# Patient Record
Sex: MALE | Race: WHITE | NOT HISPANIC OR LATINO | Employment: FULL TIME | URBAN - METROPOLITAN AREA
[De-identification: names, ages, dates, MRNs, and addresses within clinical notes are randomized per-mention and may not be internally consistent; named-entity substitution may affect disease eponyms.]

---

## 2019-03-14 ENCOUNTER — OFFICE VISIT (OUTPATIENT)
Dept: INTERNAL MEDICINE | Facility: CLINIC | Age: 24
End: 2019-03-14
Payer: COMMERCIAL

## 2019-03-14 ENCOUNTER — LAB VISIT (OUTPATIENT)
Dept: LAB | Facility: HOSPITAL | Age: 24
End: 2019-03-14
Attending: PHYSICIAN ASSISTANT
Payer: COMMERCIAL

## 2019-03-14 VITALS
HEIGHT: 68 IN | HEART RATE: 80 BPM | DIASTOLIC BLOOD PRESSURE: 70 MMHG | SYSTOLIC BLOOD PRESSURE: 126 MMHG | BODY MASS INDEX: 31.74 KG/M2 | WEIGHT: 209.44 LBS | TEMPERATURE: 98 F

## 2019-03-14 DIAGNOSIS — Z00.00 ROUTINE GENERAL MEDICAL EXAMINATION AT A HEALTH CARE FACILITY: Primary | ICD-10-CM

## 2019-03-14 DIAGNOSIS — M25.511 RIGHT ANTERIOR SHOULDER PAIN: ICD-10-CM

## 2019-03-14 DIAGNOSIS — J30.1 SEASONAL ALLERGIC RHINITIS DUE TO POLLEN: ICD-10-CM

## 2019-03-14 DIAGNOSIS — Z00.00 ROUTINE GENERAL MEDICAL EXAMINATION AT A HEALTH CARE FACILITY: ICD-10-CM

## 2019-03-14 LAB
ALBUMIN SERPL BCP-MCNC: 4.2 G/DL
ALP SERPL-CCNC: 64 U/L
ALT SERPL W/O P-5'-P-CCNC: 23 U/L
ANION GAP SERPL CALC-SCNC: 9 MMOL/L
AST SERPL-CCNC: 21 U/L
BASOPHILS # BLD AUTO: 0.02 K/UL
BASOPHILS NFR BLD: 0.3 %
BILIRUB SERPL-MCNC: 0.4 MG/DL
BUN SERPL-MCNC: 9 MG/DL
CALCIUM SERPL-MCNC: 9.8 MG/DL
CHLORIDE SERPL-SCNC: 108 MMOL/L
CO2 SERPL-SCNC: 24 MMOL/L
CREAT SERPL-MCNC: 0.9 MG/DL
DIFFERENTIAL METHOD: NORMAL
EOSINOPHIL # BLD AUTO: 0.1 K/UL
EOSINOPHIL NFR BLD: 0.8 %
ERYTHROCYTE [DISTWIDTH] IN BLOOD BY AUTOMATED COUNT: 11.8 %
EST. GFR  (AFRICAN AMERICAN): >60 ML/MIN/1.73 M^2
EST. GFR  (NON AFRICAN AMERICAN): >60 ML/MIN/1.73 M^2
GLUCOSE SERPL-MCNC: 87 MG/DL
HCT VFR BLD AUTO: 40.6 %
HGB BLD-MCNC: 14.1 G/DL
IMM GRANULOCYTES # BLD AUTO: 0.01 K/UL
IMM GRANULOCYTES NFR BLD AUTO: 0.2 %
LYMPHOCYTES # BLD AUTO: 2.2 K/UL
LYMPHOCYTES NFR BLD: 33.7 %
MCH RBC QN AUTO: 30.5 PG
MCHC RBC AUTO-ENTMCNC: 34.7 G/DL
MCV RBC AUTO: 88 FL
MONOCYTES # BLD AUTO: 0.6 K/UL
MONOCYTES NFR BLD: 9.2 %
NEUTROPHILS # BLD AUTO: 3.6 K/UL
NEUTROPHILS NFR BLD: 55.8 %
NRBC BLD-RTO: 0 /100 WBC
PLATELET # BLD AUTO: 252 K/UL
PMV BLD AUTO: 9.9 FL
POTASSIUM SERPL-SCNC: 3.8 MMOL/L
PROT SERPL-MCNC: 7.1 G/DL
RBC # BLD AUTO: 4.63 M/UL
SODIUM SERPL-SCNC: 141 MMOL/L
TSH SERPL DL<=0.005 MIU/L-ACNC: 0.8 UIU/ML
WBC # BLD AUTO: 6.43 K/UL

## 2019-03-14 PROCEDURE — 99999 PR PBB SHADOW E&M-EST. PATIENT-LVL III: CPT | Mod: PBBFAC,,, | Performed by: PHYSICIAN ASSISTANT

## 2019-03-14 PROCEDURE — 99395 PR PREVENTIVE VISIT,EST,18-39: ICD-10-PCS | Mod: S$GLB,,, | Performed by: PHYSICIAN ASSISTANT

## 2019-03-14 PROCEDURE — 84443 ASSAY THYROID STIM HORMONE: CPT

## 2019-03-14 PROCEDURE — 99999 PR PBB SHADOW E&M-EST. PATIENT-LVL III: ICD-10-PCS | Mod: PBBFAC,,, | Performed by: PHYSICIAN ASSISTANT

## 2019-03-14 PROCEDURE — 80053 COMPREHEN METABOLIC PANEL: CPT

## 2019-03-14 PROCEDURE — 85025 COMPLETE CBC W/AUTO DIFF WBC: CPT

## 2019-03-14 PROCEDURE — 99395 PREV VISIT EST AGE 18-39: CPT | Mod: S$GLB,,, | Performed by: PHYSICIAN ASSISTANT

## 2019-03-14 PROCEDURE — 36415 COLL VENOUS BLD VENIPUNCTURE: CPT | Mod: PO

## 2019-03-14 RX ORDER — FLUTICASONE PROPIONATE 50 MCG
1 SPRAY, SUSPENSION (ML) NASAL DAILY
Qty: 1 BOTTLE | Refills: 0 | Status: SHIPPED | OUTPATIENT
Start: 2019-03-14

## 2019-03-14 RX ORDER — MELOXICAM 7.5 MG/1
7.5 TABLET ORAL DAILY
Qty: 30 TABLET | Refills: 0 | Status: SHIPPED | OUTPATIENT
Start: 2019-03-14 | End: 2019-03-25

## 2019-03-14 NOTE — PROGRESS NOTES
Subjective:       Patient ID: William Strong is a 23 y.o. male.    Chief Complaint: Tinnitus and Shoulder Pain    Patient comes in today for annual   History reviewed. No pertinent past medical history.          Ringing in Ears:   Chronicity:  New  Onset:  In the past 7 days  Progression since onset:  Waxing and waning  Frequency:  Every few minutes  Pain scale:  3/10  Severity:  Mild  Duration:  Very brief  Ringing in ear characteristics:  Imparied select discretion   Associated symptoms: tinnitus, buzzing and aural fullness.  No dizziness, no vertigo, no ear congestion, no ear pain, no fever, no headaches, no rhinorrhea, no fluctuance, no otalgia, no otorrhea, no facial weakness and no visual disturbances.No dizziness.  Shoulder Pain    This is a new problem. The current episode started more than 1 month ago. There has been no history of extremity trauma. The problem occurs intermittently. The problem has been unchanged. Pertinent negatives include no fever or headaches.       Health Maintenance Due   Topic Date Due    Influenza Vaccine  08/01/2018       History reviewed. No pertinent past medical history.    Current Outpatient Medications   Medication Sig Dispense Refill    fluticasone (FLONASE) 50 mcg/actuation nasal spray 1 spray (50 mcg total) by Each Nare route once daily. 1 Bottle 0    meloxicam (MOBIC) 7.5 MG tablet Take 1 tablet (7.5 mg total) by mouth once daily. 30 tablet 0     No current facility-administered medications for this visit.        Review of Systems   Constitutional: Negative for fatigue, fever and unexpected weight change.   HENT: Positive for tinnitus. Negative for ear pain, rhinorrhea, sore throat and trouble swallowing.    Respiratory: Negative for cough and shortness of breath.    Cardiovascular: Negative for chest pain.   Gastrointestinal: Negative for abdominal pain.   Musculoskeletal: Positive for arthralgias.   Neurological: Negative for dizziness, vertigo and  "headaches.   Hematological: Negative for adenopathy. Does not bruise/bleed easily.       Objective:   /70   Pulse 80   Temp 98.3 °F (36.8 °C) (Tympanic)   Ht 5' 8" (1.727 m)   Wt 95 kg (209 lb 7 oz)   BMI 31.84 kg/m²      Physical Exam   Constitutional: He is oriented to person, place, and time. He appears well-developed and well-nourished. No distress.   HENT:   Head: Normocephalic and atraumatic.   Right Ear: External ear normal.   Left Ear: External ear normal.   Nose: Nose normal.   Mouth/Throat: Oropharynx is clear and moist.   Eyes: Conjunctivae and EOM are normal. Pupils are equal, round, and reactive to light.   Neck: Normal range of motion. Neck supple.   Cardiovascular: Normal rate, regular rhythm, normal heart sounds and intact distal pulses.   Pulmonary/Chest: Effort normal and breath sounds normal.   Abdominal: Soft. Bowel sounds are normal.   Musculoskeletal: He exhibits no edema.        Right shoulder: He exhibits decreased range of motion and tenderness.   Neurological: He is alert and oriented to person, place, and time.   Skin: Capillary refill takes less than 2 seconds.   Psychiatric: He has a normal mood and affect. His behavior is normal.         Lab Results   Component Value Date    WBC 6.43 03/14/2019    HGB 14.1 03/14/2019    HCT 40.6 03/14/2019     03/14/2019    CHOL 219 (H) 02/09/2016    TRIG 243 (H) 02/09/2016    HDL 33 (L) 02/09/2016    ALT 23 03/14/2019    AST 21 03/14/2019     03/14/2019    K 3.8 03/14/2019     03/14/2019    CREATININE 0.9 03/14/2019    BUN 9 03/14/2019    CO2 24 03/14/2019    TSH 0.797 03/14/2019       Assessment:       1. Routine general medical examination at a health care facility    2. Right anterior shoulder pain    3. Seasonal allergic rhinitis due to pollen        Plan:   Routine general medical examination at a health care facility  -     TSH; Future; Expected date: 03/14/2019  -     Comprehensive metabolic panel; Future; Expected " date: 03/14/2019  -     CBC auto differential; Future; Expected date: 03/14/2019    Right anterior shoulder pain  -     Ambulatory referral to Orthopedics       meloxicam (MOBIC) 7.5 MG tablet; Take 1 tablet (7.5 mg total) by mouth once daily.  Dispense: 30 tablet; Refill: 0  Seasonal allergic rhinitis due to pollen      -     fluticasone (FLONASE) 50 mcg/actuation nasal spray; 1 spray (50 mcg total) by Each Nare route once daily.  Dispense: 1 Bottle; Refill: 0

## 2019-03-19 DIAGNOSIS — M25.511 RIGHT SHOULDER PAIN, UNSPECIFIED CHRONICITY: Primary | ICD-10-CM

## 2019-03-25 ENCOUNTER — HOSPITAL ENCOUNTER (OUTPATIENT)
Dept: RADIOLOGY | Facility: HOSPITAL | Age: 24
Discharge: HOME | End: 2019-03-25
Attending: PHYSICIAN ASSISTANT
Payer: COMMERCIAL

## 2019-03-25 ENCOUNTER — OFFICE VISIT (OUTPATIENT)
Dept: ORTHOPEDICS | Facility: CLINIC | Age: 24
End: 2019-03-25
Payer: COMMERCIAL

## 2019-03-25 VITALS
WEIGHT: 209.44 LBS | SYSTOLIC BLOOD PRESSURE: 141 MMHG | BODY MASS INDEX: 31.74 KG/M2 | DIASTOLIC BLOOD PRESSURE: 78 MMHG | HEART RATE: 84 BPM | HEIGHT: 68 IN

## 2019-03-25 DIAGNOSIS — M75.81 ROTATOR CUFF TENDONITIS, RIGHT: Primary | ICD-10-CM

## 2019-03-25 DIAGNOSIS — M25.511 ACUTE PAIN OF RIGHT SHOULDER: ICD-10-CM

## 2019-03-25 DIAGNOSIS — M79.18 MYOFASCIAL PAIN: ICD-10-CM

## 2019-03-25 DIAGNOSIS — M25.511 RIGHT SHOULDER PAIN, UNSPECIFIED CHRONICITY: ICD-10-CM

## 2019-03-25 PROCEDURE — 20610 DRAIN/INJ JOINT/BURSA W/O US: CPT | Mod: RT,S$GLB,, | Performed by: PHYSICIAN ASSISTANT

## 2019-03-25 PROCEDURE — 73030 XR SHOULDER COMPLETE 2 OR MORE VIEWS RIGHT: ICD-10-PCS | Mod: 26,RT,, | Performed by: RADIOLOGY

## 2019-03-25 PROCEDURE — 20610 PR DRAIN/INJECT LARGE JOINT/BURSA: ICD-10-PCS | Mod: RT,S$GLB,, | Performed by: PHYSICIAN ASSISTANT

## 2019-03-25 PROCEDURE — 99999 PR PBB SHADOW E&M-EST. PATIENT-LVL III: CPT | Mod: PBBFAC,,, | Performed by: PHYSICIAN ASSISTANT

## 2019-03-25 PROCEDURE — 99204 OFFICE O/P NEW MOD 45 MIN: CPT | Mod: 25,S$GLB,, | Performed by: PHYSICIAN ASSISTANT

## 2019-03-25 PROCEDURE — 73030 X-RAY EXAM OF SHOULDER: CPT | Mod: 26,RT,, | Performed by: RADIOLOGY

## 2019-03-25 PROCEDURE — 99999 PR PBB SHADOW E&M-EST. PATIENT-LVL III: ICD-10-PCS | Mod: PBBFAC,,, | Performed by: PHYSICIAN ASSISTANT

## 2019-03-25 PROCEDURE — 73030 X-RAY EXAM OF SHOULDER: CPT | Mod: TC,RT

## 2019-03-25 PROCEDURE — 99204 PR OFFICE/OUTPT VISIT, NEW, LEVL IV, 45-59 MIN: ICD-10-PCS | Mod: 25,S$GLB,, | Performed by: PHYSICIAN ASSISTANT

## 2019-03-25 RX ORDER — TRIAMCINOLONE ACETONIDE 40 MG/ML
40 INJECTION, SUSPENSION INTRA-ARTICULAR; INTRAMUSCULAR
Status: COMPLETED | OUTPATIENT
Start: 2019-03-25 | End: 2019-03-25

## 2019-03-25 RX ORDER — MELOXICAM 15 MG/1
15 TABLET ORAL DAILY
Qty: 30 TABLET | Refills: 2 | Status: SHIPPED | OUTPATIENT
Start: 2019-03-25 | End: 2019-04-24

## 2019-03-25 RX ADMIN — TRIAMCINOLONE ACETONIDE 40 MG: 40 INJECTION, SUSPENSION INTRA-ARTICULAR; INTRAMUSCULAR at 03:03

## 2019-03-25 NOTE — LETTER
March 29, 2019      CHRIS Garvin  [address] [facility] [address] [facility]  [place] [address]           [facility]  [address]  [place] [address]  Phone: [phone]  Fax: [phone]          Patient: William Strong   MR Number: [id number]   YOB: 1995   Date of Visit: 3/25/2019       Dear Juana Zhu:    Thank you for referring William Strong to me for evaluation. Attached you will find relevant portions of my assessment and plan of care.    If you have questions, please do not hesitate to call me. I look forward to following William Strong along with you.    Sincerely,    Leila Alicea  CC:  No Recipients    If you would like to receive this communication electronically, please contact [email or web address] or [phone] to request more information on [employer] Link access.    For providers and/or their staff who would like to refer a patient to [facility], please contact us through our one-stop-shop provider referral line, [facility] , at [phone].    If you feel you have received this communication in error or would no longer like to receive these types of communications, please e-mail [email or web address]

## 2019-03-25 NOTE — PROGRESS NOTES
Subjective:     Patient ID: William Strong is a 23 y.o. male.    Chief Complaint: Pain of the Right Shoulder    William Strong is a 23 y.o. male who presents for right shoulder pain . Episode began 3 1/2 months ago. No known mechanism of injury. Patient is active in the gym and took 2 months off after pain started. Pain improved but continues to linger. Pain is located in posterior shoulder and trapezius. Pain is rated as 7/10 and is described as aching in quality. Pain is exacerbated by laying on right side and with movement of shoulder. Patient has tried Meloxicam 7.5 mg with some relief of symptoms. Patient denies swelling, tingling, numbness, and past surgeries in the shoulder.        History reviewed. No pertinent past medical history.  Past Surgical History:   Procedure Laterality Date    KNEE SURGERY Right     WISDOM TOOTH EXTRACTION       Family History   Problem Relation Age of Onset    Cervical cancer Mother     Hypertension Neg Hx     Diabetes Neg Hx     Heart disease Neg Hx     Stroke Neg Hx     Colon cancer Neg Hx     Prostate cancer Neg Hx      Social History     Socioeconomic History    Marital status: Single     Spouse name: Not on file    Number of children: 0    Years of education: Not on file    Highest education level: Not on file   Occupational History    Occupation: [employer]     Comment: [employer]   Social Needs    Financial resource strain: Not on file    Food insecurity:     Worry: Not on file     Inability: Not on file    Transportation needs:     Medical: Not on file     Non-medical: Not on file   Tobacco Use    Smoking status: Former Smoker     Packs/day: 0.50     Years: 2.00     Pack years: 1.00     Types: Cigarettes    Smokeless tobacco: Never Used   Substance and Sexual Activity    Alcohol use: No     Alcohol/week: 0.0 oz     Comment: Prior alcoholic    Drug use: No     Comment: Prior drug addiction//opiates    Sexual activity: Yes     Partners:  Female   Lifestyle    Physical activity:     Days per week: Not on file     Minutes per session: Not on file    Stress: Not on file   Relationships    Social connections:     Talks on phone: Not on file     Gets together: Not on file     Attends Synagogue service: Not on file     Active member of club or organization: Not on file     Attends meetings of clubs or organizations: Not on file     Relationship status: Not on file    Intimate partner violence:     Fear of current or ex partner: Not on file     Emotionally abused: Not on file     Physically abused: Not on file     Forced sexual activity: Not on file   Other Topics Concern    Not on file   Social History Narrative    Not on file     Medication List with Changes/Refills   Current Medications    FLUTICASONE (FLONASE) 50 MCG/ACTUATION NASAL SPRAY    1 spray (50 mcg total) by Each Nare route once daily.    MELOXICAM (MOBIC) 7.5 MG TABLET    Take 1 tablet (7.5 mg total) by mouth once daily.     Review of patient's allergies indicates:  No Known Allergies  Review of Systems   Constitution: Negative for fever and night sweats.   HENT: Negative for hearing loss.    Eyes: Negative for blurred vision and visual disturbance.   Cardiovascular: Negative for chest pain and leg swelling.   Respiratory: Negative for shortness of breath.    Endocrine: Negative for polyuria.   Hematologic/Lymphatic: Negative for bleeding problem.   Skin: Negative for rash.   Musculoskeletal: Positive for joint pain. Negative for back pain, joint swelling, muscle cramps and muscle weakness.   Gastrointestinal: Negative for melena.   Genitourinary: Negative for hematuria.   Neurological: Negative for loss of balance, numbness and paresthesias.   Psychiatric/Behavioral: Negative for altered mental status.       Objective:   Body mass index is 31.84 kg/m².  Vitals:    03/25/19 1448   BP: (!) 141/78   Pulse: 84       General: William Norton is well-developed, well-nourished, appears stated age,  in no acute distress, alert and oriented.       General    Constitutional: He is oriented to person, place, and time. He appears well-developed and well-nourished.   HENT:   Head: Normocephalic and atraumatic.   Right Ear: External ear normal.   Left Ear: External ear normal.   Nose: Nose normal.   Eyes: EOM are normal. Pupils are equal, round, and reactive to light. Right eye exhibits no discharge. Left eye exhibits no discharge.   Neck: Normal range of motion.   Cardiovascular: Normal heart sounds.    Pulmonary/Chest: Effort normal. No respiratory distress.   Abdominal: Soft.   Neurological: He is alert and oriented to person, place, and time.   Psychiatric: He has a normal mood and affect. His behavior is normal. Judgment and thought content normal.         Back (L-Spine & T-Spine) / Neck (C-Spine) Exam     Tenderness   The patient is tender to palpation of the right trapezial.   Right Shoulder Exam     Inspection/Observation   Swelling: absent  Bruising: absent  Scars: absent  Deformity: absent  Scapular Winging: absent  Scapular Dyskinesia: negative  Atrophy: absent    Tenderness   The patient is tender to palpation of the supraspinatus.    Range of Motion   Active abduction:  90 normal   Passive abduction:  100 normal   Extension:  0 normal   Forward Flexion:  180 normal   Forward Elevation: 180 normal  Adduction: 40 normal  External Rotation 0 degrees:  50 normal   External Rotation 90 degrees: 90 normal  Internal rotation 0 degrees:  T10 normal   Internal rotation 90 degrees:  30 normal     Tests & Signs   Cross arm: negative  Drop arm: negative  Solomon test: positive  Impingement: negative  Lift Off Sign: negative  Active Compression Test (Burns's Sign): negative  Yergason's Test: negative  Speed's Test: negative    Other   Sensation: normal    Left Shoulder Exam     Inspection/Observation   Swelling: absent  Bruising: absent  Scars: absent  Deformity: absent  Scapular Winging: absent  Scapular  Dyskinesia: negative  Atrophy: absent    Tenderness   The patient is experiencing no tenderness.     Range of Motion   Active abduction:  90 normal   Passive abduction:  100 normal   Extension:  0 normal   Forward Flexion:  180 normal   Forward Elevation: 180 normal  Adduction: 40 normal  External Rotation 0 degrees:  50 normal   External Rotation 90 degrees: 90 normal  Internal rotation 0 degrees:  T8 normal   Internal rotation 90 degrees:  30 normal     Tests & Signs   Cross arm: negative  Drop arm: negative  Solomon test: negative  Impingement: negative  Lift Off Sign: negative  Active Compression test (Mono's Sign): negative  Yergasons's Test: negative  Speed's Test: negative    Other   Sensation: normal       Muscle Strength   Right Upper Extremity   Shoulder Abduction: 5/5   Shoulder Internal Rotation: 5/5   Shoulder External Rotation: 5/5   Supraspinatus: 5/5/5   Subscapularis: 5/5/5   Biceps: 5/5/5   Left Upper Extremity  Shoulder Abduction: 5/5   Shoulder Internal Rotation: 5/5   Shoulder External Rotation: 5/5   Supraspinatus: 5/5/5   Subscapularis: 5/5/5   Biceps: 5/5/5     Vascular Exam     Right Pulses      Radial:                    2+      Left Pulses      Radial:                    2+      Capillary Refill  Right Hand: normal capillary refill  Left Hand: normal capillary refill    I have personally reviewed the following imaging and agree with the radiologist's findings of:   XR Shoulder: No acute osseous or soft tissue abnormality.    Assessment:     Encounter Diagnoses   Name Primary?    Rotator cuff tendonitis, right Yes    Acute pain of right shoulder     Myofascial pain         Plan:     Mobic 15 mg - discontinued 7.5 mg  PT - shoulder  Steroid injection to the right shoulder today  If not improved in 4 weeks, will consider MRI    Right Shoulder Injection Report:  After verbal consent was obtained for right shoulder injection, patient ID, site, and side were verified.  The  right  Shoulder  was sterilly prepped in the standard fashion.  A 22-gauge needle was introduced into right subacromial space from the posterior portal approach without complication. The right shoulder was then injected with 2 cc xylocaine, 2 cc bupivacaine, and 1 cc 40 mg kenalog.  A sterile bandaid was applied.  The patient was informed to apply an ice pack approximately 10min once arriving home and not to do anything strenuous for 24hours. He was instructed to call if there were any problems. The patient was discharged in stable condition.      Patient verbalizes understanding and agrees with the above plan.    Kenna Wright PA-C  Orthopedic Surgery/Sports Medicine

## 2019-03-27 ENCOUNTER — CLINICAL SUPPORT (OUTPATIENT)
Dept: REHABILITATION | Facility: HOSPITAL | Age: 24
End: 2019-03-27
Payer: COMMERCIAL

## 2019-03-27 DIAGNOSIS — M75.81 ROTATOR CUFF TENDINITIS, RIGHT: Primary | ICD-10-CM

## 2019-03-27 PROCEDURE — 97161 PT EVAL LOW COMPLEX 20 MIN: CPT

## 2019-03-27 NOTE — PROGRESS NOTES
"[facility]  Physical Therapy Initial Evaluation    Name: William Strong  Clinic Number: [id number]    Therapy Diagnosis: No diagnosis found.  Physician: Kenna Wright, *    Physician Orders: PT Eval and Treat   Medical Diagnosis from Referral: Right rotator cuff tendonitis  Evaluation Date: 3/27/2019  Authorization Period Expiration: 6/27/2019  Plan of Care Expiration: 6/30/2019  Visit # / Visits authorized: 1/1    Time In: 0804  Time Out: 0900  Total Billable Time: 50 minutes    Precautions: Standard    Subjective   Date of onset: 4 months  History of current condition - José Miguel reports: 4 months ago he began to have right shoulder pain at the back of his shoulder and at times into tricep.  He states he took a break from working out and began a few exs he learned from the Internet.   His pain had reduced and began to feel it more in the "trap".   He reports difficulty sleeping due to pain and felt it as he was driving over to clinic.    He complained of increased symptoms while receiving IFC and cryotherapy.  Pain symptoms reduced with massage.     Pain:  Current 3/10, worst 7/10, best 0/10   Location: right shoulder   Description: Aching and Sharp  Aggravating Factors: Lifting  Easing Factors: massage and relaxation    Prior Therapy: None reported  Social History:  lives with their family  Prior Level of Function: Independent  Current Level of Function:  Independent with pain and limited in performance of regular activity    Imaging,  Xray right shoulder    Medical History:   No past medical history on file.    Surgical History:   William Strong  has a past surgical history that includes Knee surgery (Right) and Beryl tooth extraction.    Medications:   William Norton has a current medication list which includes the following prescription(s): fluticasone and meloxicam.    Allergies:   Review of patient's allergies indicates:  No Known Allergies     Pts goals: To " be painfree to resume exercise program    Objective       Posture: Pt noted to present with forward head/rounded shoulder posture.    Scapular AROM standing:     Shoulder ROM:   Active/Passive Joint Range Right Left   Flexion 180 180   ABDuction 150 160   External Rotation 70 70   Internal Rotation 80 80     Cervical Spine AROM:   % Pain    0    0    0    0    0    0     Strength:  Muscle (Myotome) Right Left   Shoulder Flex 5/5 5/5   Shoulder Abduction 4+/5 5/5   Elbow Flexors (C5) 5/5 5/5   Wrist Extensors (C6) 5/5 5/5   Elbow Extensors (C7) 5/5 5/5     Sensation: Intact    Special Test:  Solomon Neg      Empty Can Neg  Drop Arm Neg     Speeds Pos      Joint Mobility:  Scapular mobility intact    Quick DASH Shoulder Questionnaire           Score 1-5  1. Opening a tight jar       1   2. Do heavy household chores     1  3. Carry a shopping bag or briefcase     2  4. Wash your back      1  5. Use a knife to cut food     1  6. Recreactional activities requiring force   4  7. Social Limitation      4  8. Work/ADL limitation      3  9. Arm, Shoulder or hand Pain    4  10. Tingling       2  11. Sleeping Limitation      5        Function: Patient reports 39 % disability based on score of the Upper Extremity Functional Scale.    Tenderness to palpation:  Patient tender to the right upper trapezius superficially with increased tenderness at base of neck with palpation.    TREATMENT   Treatment Time In: 845  Treatment Time Out: 0900  Total Treatment time separate from Evaluation: 0 minutes    José Miguel received therapeutic exercises to develop strength for minutes includin. Scapular retraction  2. Serratus punches  3. Door stretches  4. Pec stretches with lying on towel midline upper thoracic spine 30 seconds  5. Standing IR/ER with red therapy band     José Miguel received the following supervised modalities after being cleared for contradictions: IFC Electrical Stimulation:  William Norton  received IFC Electrical Stimulation for pain control applied to the right upper trap and posterior deltiod. Pt received stimulation at 40 % scan at a frequency of 150 MHz for 15 minutes. William Norton tolderated treatment well without any adverse effects.        José Miguel received cold pack for 15 minutes to Right shoulder.    Home Exercises and Patient Education Provided    Education provided:   -Education on condition, HEP, and posture     Written Home Exercises Provided: Patient instructed to cont prior HEP.  Exercises were reviewed and José Miguel was able to demonstrate them prior to the end of the session.  José Miguel demonstrated good  understanding of the education provided.   1. Scapular retraction  2. Serratus punches  3. Door stretches  4. Pec stretches with lying on towel midline upper thoracic spine.  5. Standing IR/ER with red therapy band (provided)    Assessment   William Norton is a 23 y.o. male referred to outpatient Physical Therapy with a medical diagnosis of right rotator cuff tendonitis. Pt presents with painful right shoulder impacting activities    Pt prognosis is Good.   Pt will benefit from skilled outpatient Physical Therapy to address the deficits stated above and in the chart below, provide pt/family education, and to maximize pt's level of independence.     Plan of care discussed with patient: Yes  Pt's spiritual, cultural and educational needs considered and patient is agreeable to the plan of care and goals as stated below:     Anticipated Barriers for therapy: None     Medical Necessity is demonstrated by the following  History  Co-morbidities and personal factors that may impact the plan of care Co-morbidities:   None    Personal Factors:   lifestyle     low   Examination  Body Structures and Functions, activity limitations and participation restrictions that may impact the plan of care Body Regions:   upper extremities    Body Systems:    ROM    Participation Restrictions:   None    Activity  limitations:   Learning and applying knowledge  no deficits    General Tasks and Commands  no deficits    Communication  no deficits    Mobility  no deficits    Self care  no deficits    Domestic Life  no deficits    Interactions/Relationships  no deficits    Life Areas  no deficits    Community and Social Life  no deficits         low   Clinical Presentation stable and uncomplicated low   Decision Making/ Complexity Score: low     Goals:  Short Term Goals: Today   1. I with HEP        Plan   Plan of care Certification: 3/27/2019 to 4/27/2019.    Outpatient Physical Therapy 1 times weekly for 1 weeks to include the following interventions    Reza Mathis, PT    Thank you for this referral.    These services are reasonable and necessary for the conditions set forth above while under my care.

## 2019-04-01 ENCOUNTER — TELEPHONE (OUTPATIENT)
Dept: ORTHOPEDICS | Facility: CLINIC | Age: 24
End: 2019-04-01

## 2019-04-01 NOTE — TELEPHONE ENCOUNTER
----- Message from Kenna Wright PA-C sent at 4/1/2019  3:35 PM CDT -----  Contact: self   Tell him this can be common after PT session. Tell him to continue Mobic and add Tylenol Extra Strength as needed. Continue PT. Ice for 20 min followed by heat twice a day. He needs to try the PT a little longer to see if that will help him, but to make sure he is taking Mobic every day.     ----- Message -----  From: Leandra Cortes  Sent: 4/1/2019  11:47 AM  To: MITA Soto  Mr Strong has called in with the complaint of pain in his right bicep area with radiation up to his neck. He has had 1 session of therapy and a HEP that as given to him. He would like to know what else can be done for the increased pain he is having in a different area. He states that the injection did help his shoulder pain. Please advise.   ----- Message -----  From: Quyen Mahan  Sent: 4/1/2019   9:21 AM  To: Adriana Pierson Staff    Type:  Needs Medical Advice    Who Called: patient  Symptoms (please be specific): pain   How long has patient had these symptoms:  Since wednesday  Pharmacy name and phone #:    [id number] [place] [place] - [address]  [address]  [place] [facility] [place] [address]  Phone: [phone] Fax: [phone]        Would the patient rather a call back or a response via [facility]? call  Best Call Back Number: [phone]  Additional Information: patient was not experiencing this pain when seen. It is getting worse by the day

## 2019-04-26 ENCOUNTER — OFFICE VISIT (OUTPATIENT)
Dept: ORTHOPEDICS | Facility: CLINIC | Age: 24
End: 2019-04-26
Payer: COMMERCIAL

## 2019-04-26 VITALS
DIASTOLIC BLOOD PRESSURE: 74 MMHG | BODY MASS INDEX: 31.67 KG/M2 | HEART RATE: 79 BPM | SYSTOLIC BLOOD PRESSURE: 132 MMHG | HEIGHT: 68 IN | WEIGHT: 209 LBS

## 2019-04-26 DIAGNOSIS — M75.81 ROTATOR CUFF TENDONITIS, RIGHT: Primary | ICD-10-CM

## 2019-04-26 DIAGNOSIS — M25.511 ACUTE PAIN OF RIGHT SHOULDER: ICD-10-CM

## 2019-04-26 PROCEDURE — 99214 OFFICE O/P EST MOD 30 MIN: CPT | Mod: ,,, | Performed by: PHYSICIAN ASSISTANT

## 2019-04-26 PROCEDURE — 99214 PR OFFICE/OUTPT VISIT, EST, LEVL IV, 30-39 MIN: ICD-10-PCS | Mod: ,,, | Performed by: PHYSICIAN ASSISTANT

## 2019-04-26 PROCEDURE — 99999 PR PBB SHADOW E&M-EST. PATIENT-LVL III: ICD-10-PCS | Mod: PBBFAC,,, | Performed by: PHYSICIAN ASSISTANT

## 2019-04-26 PROCEDURE — 99999 PR PBB SHADOW E&M-EST. PATIENT-LVL III: CPT | Mod: PBBFAC,,, | Performed by: PHYSICIAN ASSISTANT

## 2019-04-26 NOTE — PROGRESS NOTES
Subjective:     Patient ID: William Strong is a 23 y.o. male.    Chief Complaint: No chief complaint on file.    William Strong is a 23 y.o. male who presents for right shoulder pain . Episode began 3 1/2 months ago. No known mechanism of injury. Patient is active in the gym and took 2 months off after pain started. Pain improved but continues to linger. Pain is located in posterior shoulder and trapezius. Pain is rated as 7/10 and is described as aching in quality. Pain is exacerbated by laying on right side and with movement of shoulder. Patient has tried Meloxicam 7.5 mg with some relief of symptoms. Patient denies swelling, tingling, numbness, and past surgeries in the shoulder.    Today, patient presents for 6 week follow up. Overall his shoulder has improved. Patient states he has taken break from gym. Patient was evaluated by PT who developed HEP for him since he was self pay. He has been completing HEP daily. Patient continues to take Mobic 15 mg and Tylenol ES as needed. Patient also began outside acupuncture which has helped immensely with his symptoms. He believes the majority of his symptoms are coming from his trapezial tightness.       No past medical history on file.  Past Surgical History:   Procedure Laterality Date    KNEE SURGERY Right     WISDOM TOOTH EXTRACTION       Family History   Problem Relation Age of Onset    Cervical cancer Mother     Hypertension Neg Hx     Diabetes Neg Hx     Heart disease Neg Hx     Stroke Neg Hx     Colon cancer Neg Hx     Prostate cancer Neg Hx      Social History     Socioeconomic History    Marital status: Single     Spouse name: Not on file    Number of children: 0    Years of education: Not on file    Highest education level: Not on file   Occupational History    Occupation: [employer]     Comment: [employer]   Social Needs    Financial resource strain: Not on file    Food insecurity:     Worry: Not on file     Inability: Not on file     Transportation needs:     Medical: Not on file     Non-medical: Not on file   Tobacco Use    Smoking status: Former Smoker     Packs/day: 0.50     Years: 2.00     Pack years: 1.00     Types: Cigarettes    Smokeless tobacco: Never Used   Substance and Sexual Activity    Alcohol use: No     Alcohol/week: 0.0 oz     Comment: Prior alcoholic    Drug use: No     Comment: Prior drug addiction//opiates    Sexual activity: Yes     Partners: Female   Lifestyle    Physical activity:     Days per week: Not on file     Minutes per session: Not on file    Stress: Not on file   Relationships    Social connections:     Talks on phone: Not on file     Gets together: Not on file     Attends Confucianist service: Not on file     Active member of club or organization: Not on file     Attends meetings of clubs or organizations: Not on file     Relationship status: Not on file   Other Topics Concern    Not on file   Social History Narrative    Not on file     Medication List with Changes/Refills   Current Medications    FLUTICASONE (FLONASE) 50 MCG/ACTUATION NASAL SPRAY    1 spray (50 mcg total) by Each Nare route once daily.     Review of patient's allergies indicates:  No Known Allergies  Review of Systems   Constitution: Negative for fever and night sweats.   HENT: Negative for hearing loss.    Eyes: Negative for blurred vision and visual disturbance.   Cardiovascular: Negative for chest pain and leg swelling.   Respiratory: Negative for shortness of breath.    Endocrine: Negative for polyuria.   Hematologic/Lymphatic: Negative for bleeding problem.   Skin: Negative for rash.   Musculoskeletal: Positive for joint pain. Negative for back pain, joint swelling, muscle cramps and muscle weakness.   Gastrointestinal: Negative for melena.   Genitourinary: Negative for hematuria.   Neurological: Negative for loss of balance, numbness and paresthesias.   Psychiatric/Behavioral: Negative for altered mental status.       Objective:    There is no height or weight on file to calculate BMI.  There were no vitals filed for this visit.    General: William Norton is well-developed, well-nourished, appears stated age, in no acute distress, alert and oriented.       General    Constitutional: He is oriented to person, place, and time. He appears well-developed and well-nourished.   HENT:   Head: Normocephalic and atraumatic.   Right Ear: External ear normal.   Left Ear: External ear normal.   Nose: Nose normal.   Eyes: EOM are normal. Pupils are equal, round, and reactive to light. Right eye exhibits no discharge. Left eye exhibits no discharge.   Neck: Normal range of motion.   Cardiovascular: Normal heart sounds.    Pulmonary/Chest: Effort normal. No respiratory distress.   Abdominal: Soft.   Neurological: He is alert and oriented to person, place, and time.   Psychiatric: He has a normal mood and affect. His behavior is normal. Judgment and thought content normal.         Back (L-Spine & T-Spine) / Neck (C-Spine) Exam     Tenderness   The patient is tender to palpation of the right trapezial.   Right Shoulder Exam     Inspection/Observation   Swelling: absent  Bruising: absent  Scars: absent  Deformity: absent  Scapular Winging: absent  Scapular Dyskinesia: negative  Atrophy: absent    Range of Motion   Active abduction:  90 normal   Passive abduction:  100 normal   Extension:  0 normal   Forward Flexion:  180 normal   Forward Elevation: 180 normal  Adduction: 40 normal  External Rotation 0 degrees:  50 normal   External Rotation 90 degrees: 90 normal  Internal rotation 0 degrees:  T8 normal   Internal rotation 90 degrees:  30 normal     Tests & Signs   Cross arm: negative  Drop arm: negative  Solomon test: negative  Impingement: negative  Lift Off Sign: negative  Active Compression Test (Stillmore's Sign): negative  Yergason's Test: negative  Speed's Test: negative    Other   Sensation: normal    Left Shoulder Exam     Inspection/Observation   Swelling:  absent  Bruising: absent  Scars: absent  Deformity: absent  Scapular Winging: absent  Scapular Dyskinesia: negative  Atrophy: absent    Tenderness   The patient is experiencing no tenderness.     Range of Motion   Active abduction:  90 normal   Passive abduction:  100 normal   Extension:  0 normal   Forward Flexion:  180 normal   Forward Elevation: 180 normal  Adduction: 40 normal  External Rotation 0 degrees:  50 normal   External Rotation 90 degrees: 90 normal  Internal rotation 0 degrees:  T8 normal   Internal rotation 90 degrees:  30 normal     Tests & Signs   Cross arm: negative  Drop arm: negative  Solomon test: negative  Impingement: negative  Lift Off Sign: negative  Active Compression test (Eden's Sign): negative  Yergasons's Test: negative  Speed's Test: negative    Other   Sensation: normal       Muscle Strength   Right Upper Extremity   Shoulder Abduction: 5/5   Shoulder Internal Rotation: 5/5   Shoulder External Rotation: 5/5   Supraspinatus: 5/5/5   Subscapularis: 5/5/5   Biceps: 5/5/5   Left Upper Extremity  Shoulder Abduction: 5/5   Shoulder Internal Rotation: 5/5   Shoulder External Rotation: 5/5   Supraspinatus: 5/5/5   Subscapularis: 5/5/5   Biceps: 5/5/5     Vascular Exam     Right Pulses      Radial:                    2+      Left Pulses      Radial:                    2+      Capillary Refill  Right Hand: normal capillary refill  Left Hand: normal capillary refill    I have personally reviewed the following imaging and agree with the radiologist's findings of:   XR Shoulder: No acute osseous or soft tissue abnormality.    Assessment:     Encounter Diagnoses   Name Primary?    Rotator cuff tendonitis, right Yes    Acute pain of right shoulder         Plan:     RICE therapy  Continue Mobic 15 mg - Discussed that I will refill if he needs without visit  Continue HEP  Slow return to Gym/Weightlifting  Continue Acupuncture/Dry needling  Will hold off on MRI for now as patient has improved and  must pay out of pocket.   Follow up PRN    Patient verbalizes understanding and agrees with the above plan.    Kenna Wright PA-C  Orthopedic Surgery/Sports Medicine

## 2019-06-06 ENCOUNTER — DOCUMENTATION ONLY (OUTPATIENT)
Dept: REHABILITATION | Facility: HOSPITAL | Age: 24
End: 2019-06-06